# Patient Record
Sex: FEMALE | Race: WHITE | Employment: OTHER | URBAN - METROPOLITAN AREA
[De-identification: names, ages, dates, MRNs, and addresses within clinical notes are randomized per-mention and may not be internally consistent; named-entity substitution may affect disease eponyms.]

---

## 2017-02-16 ENCOUNTER — OFFICE VISIT (OUTPATIENT)
Dept: FAMILY MEDICINE CLINIC | Facility: CLINIC | Age: 77
End: 2017-02-16

## 2017-02-16 VITALS
TEMPERATURE: 98 F | HEIGHT: 60 IN | SYSTOLIC BLOOD PRESSURE: 126 MMHG | DIASTOLIC BLOOD PRESSURE: 78 MMHG | HEART RATE: 67 BPM | WEIGHT: 177 LBS | RESPIRATION RATE: 16 BRPM | BODY MASS INDEX: 34.75 KG/M2

## 2017-02-16 DIAGNOSIS — H90.41 SENSORINEURAL HEARING LOSS (SNHL) OF RIGHT EAR WITH UNRESTRICTED HEARING OF LEFT EAR: ICD-10-CM

## 2017-02-16 DIAGNOSIS — L60.0 INGROWN TOENAIL WITHOUT INFECTION: ICD-10-CM

## 2017-02-16 DIAGNOSIS — I10 ESSENTIAL HYPERTENSION WITH GOAL BLOOD PRESSURE LESS THAN 130/80: Primary | ICD-10-CM

## 2017-02-16 DIAGNOSIS — Z23 NEED FOR PNEUMOCOCCAL VACCINATION: ICD-10-CM

## 2017-02-16 DIAGNOSIS — N39.45 CONTINUOUS LEAKAGE OF URINE: ICD-10-CM

## 2017-02-16 DIAGNOSIS — L82.1 SEBORRHEIC KERATOSES: Chronic | ICD-10-CM

## 2017-02-16 PROCEDURE — 90732 PPSV23 VACC 2 YRS+ SUBQ/IM: CPT | Performed by: FAMILY MEDICINE

## 2017-02-16 PROCEDURE — G0009 ADMIN PNEUMOCOCCAL VACCINE: HCPCS | Performed by: FAMILY MEDICINE

## 2017-02-16 PROCEDURE — 99214 OFFICE O/P EST MOD 30 MIN: CPT | Performed by: FAMILY MEDICINE

## 2017-02-16 NOTE — PROGRESS NOTES
Caryle Eth is a 77year old female. HPI:   Multiple issues. Understands extended time will be needed for list of issues today and agrees:  Here for hypertension management and doing well. Needs Prevnar 23 shot today.  VIS reviewed and wants to get shot of hemorrhage)    • Cerebral aneurysm       Social History:    Smoking Status: Never Smoker                      Alcohol Use: Yes                Comment: rare       REVIEW OF SYSTEMS:   GENERAL HEALTH: multiple issues today--see HPI notes above for further that time was spent in care planning and discussion and remaining time in history and physical exam and she agrees.     Diagnoses and all orders for this visit:    Essential hypertension with goal blood pressure less than 130/80  Managed by cardiology and whitley

## 2017-02-16 NOTE — PATIENT INSTRUCTIONS
Grayson Ramos you were seen for hypertension management and doing well. You also got a Prevnar 23 booster and this is good for seven years. See me for an annual visit.  Take care, Dr. Krystian Wright    Pneumococcal Vaccination  There are 2 pneumococcal vaccinations · In a 4-dose series in infants  · One time in adults; some people need a second dose of one of the vaccines  Your healthcare provider can tell you more about the vaccines, whether you should get them, and the number of shots you should get.   Date Last Rev · Cut back on how much salt you get in your diet. Here’s how to do this:  ¨ Don’t eat foods that have a lot of salt. These include olives, pickles, smoked meats, and salted potato chips. ¨ Don’t add salt to your food at the table.   ¨ Use only small amount · Sudden severe pain in your belly (abdomen)  · Extreme drowsiness, confusion, or fainting  · Dizziness or dizziness with a spinning sensation (vertigo)  · Weakness of an arm or leg or one side of the face  · You have problems speaking or seeing   Date Las

## 2017-03-07 RX ORDER — LISINOPRIL 20 MG/1
TABLET ORAL
Qty: 90 TABLET | Refills: 1 | Status: SHIPPED | OUTPATIENT
Start: 2017-03-07 | End: 2017-09-12

## 2017-06-12 DIAGNOSIS — E78.5 DYSLIPIDEMIA: Primary | ICD-10-CM

## 2017-06-12 RX ORDER — SIMVASTATIN 40 MG
TABLET ORAL
Qty: 30 TABLET | Refills: 0 | Status: SHIPPED | OUTPATIENT
Start: 2017-06-12 | End: 2017-07-17

## 2017-06-12 NOTE — TELEPHONE ENCOUNTER
Per protocol patient is overdue for lipid labs, lab placed in system for pt to complete.  Medication sent for 30 days no refills until pt completes labs,

## 2017-06-16 ENCOUNTER — LAB ENCOUNTER (OUTPATIENT)
Dept: LAB | Age: 77
End: 2017-06-16
Attending: FAMILY MEDICINE
Payer: MEDICARE

## 2017-06-16 DIAGNOSIS — E78.5 HYPERLIPEMIA: Primary | ICD-10-CM

## 2017-06-16 PROCEDURE — 36415 COLL VENOUS BLD VENIPUNCTURE: CPT

## 2017-06-16 PROCEDURE — 80061 LIPID PANEL: CPT

## 2017-06-23 NOTE — PROGRESS NOTES
Quick Note:    Please call pt with normal results of lipid panel and healthy increase in HDL to 99 now and repeat as directed at least annually --- Dr. Symone Owens  ______

## 2017-07-17 DIAGNOSIS — E78.5 DYSLIPIDEMIA: ICD-10-CM

## 2017-07-17 RX ORDER — SIMVASTATIN 40 MG
TABLET ORAL
Qty: 90 TABLET | Refills: 1 | Status: SHIPPED | OUTPATIENT
Start: 2017-07-17 | End: 2017-07-20

## 2017-07-18 ENCOUNTER — TELEPHONE (OUTPATIENT)
Dept: FAMILY MEDICINE CLINIC | Facility: CLINIC | Age: 77
End: 2017-07-18

## 2017-07-18 DIAGNOSIS — E78.5 DYSLIPIDEMIA: ICD-10-CM

## 2017-07-18 DIAGNOSIS — Z12.39 BREAST CANCER SCREENING: Primary | ICD-10-CM

## 2017-07-18 NOTE — TELEPHONE ENCOUNTER
Pt needs an order placed for mammo placed in system. Let front staff know when completed so we can call pt.     Prior Vinny Hagan is need for Simivastatin 40, tried to contact lyle 4 times unable to leave a message

## 2017-07-20 RX ORDER — SIMVASTATIN 40 MG
TABLET ORAL
Qty: 90 TABLET | Refills: 1 | Status: SHIPPED | OUTPATIENT
Start: 2017-07-20 | End: 2018-03-12

## 2017-07-20 NOTE — TELEPHONE ENCOUNTER
Call pt that her mammogram is ordered. Please send message to Anya Harley to get pt prior authorization for the Simvastatin.    Dr. Lagos Areas

## 2017-08-10 ENCOUNTER — HOSPITAL ENCOUNTER (OUTPATIENT)
Dept: MAMMOGRAPHY | Age: 77
Discharge: HOME | End: 2017-08-10
Attending: FAMILY MEDICINE
Payer: MEDICARE

## 2017-08-10 DIAGNOSIS — Z12.39 BREAST CANCER SCREENING: ICD-10-CM

## 2017-08-10 PROCEDURE — 77067 SCR MAMMO BI INCL CAD: CPT | Performed by: FAMILY MEDICINE

## 2017-08-29 ENCOUNTER — OFFICE VISIT (OUTPATIENT)
Dept: FAMILY MEDICINE CLINIC | Facility: CLINIC | Age: 77
End: 2017-08-29

## 2017-08-29 VITALS
HEART RATE: 76 BPM | DIASTOLIC BLOOD PRESSURE: 76 MMHG | HEIGHT: 60 IN | SYSTOLIC BLOOD PRESSURE: 122 MMHG | WEIGHT: 179 LBS | RESPIRATION RATE: 16 BRPM | BODY MASS INDEX: 35.14 KG/M2 | OXYGEN SATURATION: 99 % | TEMPERATURE: 98 F

## 2017-08-29 DIAGNOSIS — Z71.3 DIETARY COUNSELING: ICD-10-CM

## 2017-08-29 DIAGNOSIS — E55.9 VITAMIN D DEFICIENCY: ICD-10-CM

## 2017-08-29 DIAGNOSIS — Z00.00 ENCOUNTER FOR ANNUAL HEALTH EXAMINATION: Primary | ICD-10-CM

## 2017-08-29 DIAGNOSIS — Z71.82 EXERCISE COUNSELING: ICD-10-CM

## 2017-08-29 DIAGNOSIS — Z13.31 DEPRESSION SCREENING: ICD-10-CM

## 2017-08-29 LAB
APPEARANCE: CLEAR
MULTISTIX LOT#: NORMAL NUMERIC
PH, URINE: 7 (ref 4.5–8)
SPECIFIC GRAVITY: 1.02 (ref 1–1.03)
URINE-COLOR: YELLOW
UROBILINOGEN,SEMI-QN: 0.2 MG/DL (ref 0–1.9)

## 2017-08-29 PROCEDURE — G0439 PPPS, SUBSEQ VISIT: HCPCS | Performed by: FAMILY MEDICINE

## 2017-08-29 PROCEDURE — G0444 DEPRESSION SCREEN ANNUAL: HCPCS | Performed by: FAMILY MEDICINE

## 2017-08-29 PROCEDURE — 81003 URINALYSIS AUTO W/O SCOPE: CPT | Performed by: FAMILY MEDICINE

## 2017-08-29 NOTE — PATIENT INSTRUCTIONS
Veronica Baker's SCREENING SCHEDULE   Tests on this list are recommended by your physician but may not be covered, or covered at this frequency, by your insurer. Please check with your insurance carrier before scheduling to verify coverage.    PREVENTATIV doctor annually    Bone Density Screening     Bone density screening   Covered every 2 yrs after age 72    Covered yearly for Long term Glucocorticoid medication (Steroids) Requires diagnosis related to osteoporosis or estrogen deficiency.    Biennial benef home computer and printer. (the forms are also available in [address])  [email or web address]  This link also has information from the [facility] regarding Advance Directives.

## 2017-08-29 NOTE — PROGRESS NOTES
HPI:   Phani Gillette is a 69year old female who presents for a Medicare Subsequent Annual Wellness visit (Pt already had Initial Annual Wellness).   Pt sees neurosurgery after aneurysm treatment and no headaches; has age related cataract and glaucoma the 8/29/17 encounter (Office Visit) with Sheba Apple, DO:  simvastatin 40 MG Oral Tab TAKE 1 TABLET BY MOUTH EVERY EVENING   LISINOPRIL 20 MG Oral Tab TAKE 1 TABLET(20 MG) BY MOUTH EVERY DAY   diazepam 2 MG Oral Tab 1-2 tab po one hour before MRI   C pain, has chronic heartburn  : denies dysuria, vaginal discharge or itching, no complaint of urinary incontinence   MUSCULOSKELETAL: denies back pain  NEURO: denies headaches; aneurysm repair history   PSYCHE: denies depression but recent anxiety  HEMATO supraclavicular, and axillary nodes normal   Neurologic: Normal         SUGGESTED VACCINATIONS - Influenza, Pneumococcal, Zoster, Tetanus     Immunization History   Administered Date(s) Administered   • Fluzone Vaccine Medicare () 10/22/2014   • HIGH patient and provided information       Healthcare Power edwin Dumont on file in Epic:    Ho Mackey has a Power of  for [employer] on file in [address] [place] [address] Rd.                  General Health     In the past six months, have you lost more than 10 pounds wit LAST 2 WEEKS   Little interest or pleasure in doing things (over the last two weeks)?: Not at all    Feeling down, depressed, or hopeless (over the last two weeks)?: Not at all    PHQ-2 SCORE: 0        Advance Directives     Do you have a healthcare power Immunization Activity if applicable)     Influenza  Covered Annually 10/14/2016 Please get every year    Pneumococcal 13 (Prevnar)  Covered Once after 65 No vaccine history found Please get once after your 65th birthday    Pneumococcal 23 (Pneumovax)  [place]

## 2017-09-12 RX ORDER — LISINOPRIL 20 MG/1
TABLET ORAL
Qty: 90 TABLET | Refills: 0 | Status: SHIPPED | OUTPATIENT
Start: 2017-09-12 | End: 2017-12-14

## 2017-09-12 NOTE — TELEPHONE ENCOUNTER
Requesting: Lisinopril 20mg   LOV: 8/29/17  RTC: 6 months  Last Labs: 8/18/16 - CMP  Filled: 3/7/17 #90 with 1 refills    Future Appointments  Date Time Provider Ema Michael   10/26/2017 4:00 PM Wyline Ormond, MD [facility]     Refill michael

## 2017-11-15 ENCOUNTER — IMMUNIZATION (OUTPATIENT)
Dept: FAMILY MEDICINE CLINIC | Facility: CLINIC | Age: 77
End: 2017-11-15

## 2017-11-15 ENCOUNTER — MED REC SCAN ONLY (OUTPATIENT)
Dept: FAMILY MEDICINE CLINIC | Facility: CLINIC | Age: 77
End: 2017-11-15

## 2017-11-15 PROCEDURE — 90653 IIV ADJUVANT VACCINE IM: CPT | Performed by: FAMILY MEDICINE

## 2017-11-15 PROCEDURE — G0008 ADMIN INFLUENZA VIRUS VAC: HCPCS | Performed by: FAMILY MEDICINE

## 2017-11-22 ENCOUNTER — LAB ENCOUNTER (OUTPATIENT)
Dept: LAB | Age: 77
End: 2017-11-22
Attending: FAMILY MEDICINE
Payer: MEDICARE

## 2017-11-22 DIAGNOSIS — Z00.00 ENCOUNTER FOR ANNUAL HEALTH EXAMINATION: ICD-10-CM

## 2017-11-22 PROCEDURE — 84443 ASSAY THYROID STIM HORMONE: CPT

## 2017-11-22 PROCEDURE — 80053 COMPREHEN METABOLIC PANEL: CPT | Performed by: FAMILY MEDICINE

## 2017-11-22 PROCEDURE — 84439 ASSAY OF FREE THYROXINE: CPT

## 2017-11-22 PROCEDURE — 85025 COMPLETE CBC W/AUTO DIFF WBC: CPT | Performed by: FAMILY MEDICINE

## 2017-11-22 PROCEDURE — 36415 COLL VENOUS BLD VENIPUNCTURE: CPT | Performed by: FAMILY MEDICINE

## 2017-11-22 PROCEDURE — 82306 VITAMIN D 25 HYDROXY: CPT | Performed by: FAMILY MEDICINE

## 2017-11-23 NOTE — PROGRESS NOTES
Please call pt with normal results of thyroid labs  and repeat annually as directed---  Dr. Mooney Fraction

## 2017-11-23 NOTE — PROGRESS NOTES
Please call pt with stable lab results of CBC, VIT D and CMP and repeat as directed in 6 mos again--  Dr. Joey Andersen

## 2017-12-14 RX ORDER — LISINOPRIL 20 MG/1
TABLET ORAL
Qty: 90 TABLET | Refills: 0 | Status: SHIPPED | OUTPATIENT
Start: 2017-12-14 | End: 2018-03-12

## 2017-12-14 NOTE — TELEPHONE ENCOUNTER
Requested Medications   LISINOPRIL 20MG TABLETS  Will file in chart as: LISINOPRIL 20 MG Oral Tab  TAKE 1 TABLET(20 MG) BY MOUTH EVERY DAY       Disp: 90 tablet Refills: 0    Class: Normal Start: 12/14/2017   Originally ordered: 2 years ago by Ravinder Contreras

## 2017-12-22 PROCEDURE — 88305 TISSUE EXAM BY PATHOLOGIST: CPT | Performed by: INTERNAL MEDICINE

## 2018-03-08 RX ORDER — LISINOPRIL 20 MG/1
TABLET ORAL
Qty: 90 TABLET | Refills: 0 | OUTPATIENT
Start: 2018-03-08

## 2018-03-08 NOTE — TELEPHONE ENCOUNTER
No future appointments. Medication denied. Pt Is overdue for office visit. Pharmacy flagged to have pt call and schedule an appt.

## 2018-03-12 ENCOUNTER — OFFICE VISIT (OUTPATIENT)
Dept: FAMILY MEDICINE CLINIC | Facility: CLINIC | Age: 78
End: 2018-03-12

## 2018-03-12 VITALS
TEMPERATURE: 97 F | SYSTOLIC BLOOD PRESSURE: 122 MMHG | HEIGHT: 60 IN | HEART RATE: 76 BPM | BODY MASS INDEX: 35.5 KG/M2 | WEIGHT: 180.81 LBS | DIASTOLIC BLOOD PRESSURE: 80 MMHG | RESPIRATION RATE: 16 BRPM

## 2018-03-12 DIAGNOSIS — E78.5 DYSLIPIDEMIA: ICD-10-CM

## 2018-03-12 DIAGNOSIS — Z23 NEED FOR VACCINATION FOR STREP PNEUMONIAE: ICD-10-CM

## 2018-03-12 DIAGNOSIS — I10 ESSENTIAL HYPERTENSION WITH GOAL BLOOD PRESSURE LESS THAN 130/80: Primary | ICD-10-CM

## 2018-03-12 PROCEDURE — 99213 OFFICE O/P EST LOW 20 MIN: CPT | Performed by: FAMILY MEDICINE

## 2018-03-12 PROCEDURE — G0009 ADMIN PNEUMOCOCCAL VACCINE: HCPCS | Performed by: FAMILY MEDICINE

## 2018-03-12 PROCEDURE — 90670 PCV13 VACCINE IM: CPT | Performed by: FAMILY MEDICINE

## 2018-03-12 RX ORDER — SIMVASTATIN 40 MG
TABLET ORAL
Qty: 90 TABLET | Refills: 3 | Status: SHIPPED | OUTPATIENT
Start: 2018-03-12 | End: 2019-04-23

## 2018-03-12 RX ORDER — LISINOPRIL 20 MG/1
TABLET ORAL
Qty: 90 TABLET | Refills: 3 | Status: SHIPPED | OUTPATIENT
Start: 2018-03-12 | End: 2019-03-07

## 2018-03-12 NOTE — PROGRESS NOTES
[facility] Group Visit Note  3/12/2018      Subjective:      Patient ID: Kaylyn Singh is a 69year old female. Chief Complaint:  Patient presents with:  Establish Care: former Dr. Ann Montanez patient. Med refills.       HPI:  Kaylyn Singh is a 69 yea

## 2018-07-24 ENCOUNTER — TELEPHONE (OUTPATIENT)
Dept: FAMILY MEDICINE CLINIC | Facility: CLINIC | Age: 78
End: 2018-07-24

## 2018-07-24 DIAGNOSIS — Z12.31 ENCOUNTER FOR SCREENING MAMMOGRAM FOR MALIGNANT NEOPLASM OF BREAST: Primary | ICD-10-CM

## 2018-08-16 ENCOUNTER — HOSPITAL ENCOUNTER (OUTPATIENT)
Dept: MAMMOGRAPHY | Age: 78
Discharge: HOME | End: 2018-08-16
Attending: FAMILY MEDICINE
Payer: MEDICARE

## 2018-08-16 DIAGNOSIS — Z12.31 ENCOUNTER FOR SCREENING MAMMOGRAM FOR MALIGNANT NEOPLASM OF BREAST: ICD-10-CM

## 2018-08-16 PROCEDURE — 77067 SCR MAMMO BI INCL CAD: CPT | Performed by: FAMILY MEDICINE

## 2018-08-16 PROCEDURE — 77063 BREAST TOMOSYNTHESIS BI: CPT | Performed by: FAMILY MEDICINE

## 2018-09-13 ENCOUNTER — OFFICE VISIT (OUTPATIENT)
Dept: FAMILY MEDICINE CLINIC | Facility: CLINIC | Age: 78
End: 2018-09-13
Payer: MEDICARE

## 2018-09-13 VITALS
WEIGHT: 181 LBS | SYSTOLIC BLOOD PRESSURE: 124 MMHG | DIASTOLIC BLOOD PRESSURE: 80 MMHG | TEMPERATURE: 98 F | RESPIRATION RATE: 16 BRPM | HEIGHT: 60 IN | BODY MASS INDEX: 35.53 KG/M2 | HEART RATE: 80 BPM

## 2018-09-13 DIAGNOSIS — E55.9 VITAMIN D DEFICIENCY: ICD-10-CM

## 2018-09-13 DIAGNOSIS — M47.12 OSTEOARTHRITIS OF CERVICAL SPINE WITH MYELOPATHY: ICD-10-CM

## 2018-09-13 DIAGNOSIS — E78.5 DYSLIPIDEMIA: ICD-10-CM

## 2018-09-13 DIAGNOSIS — Z78.0 POSTMENOPAUSAL: ICD-10-CM

## 2018-09-13 DIAGNOSIS — Z00.00 ENCOUNTER FOR ANNUAL HEALTH EXAMINATION: Primary | ICD-10-CM

## 2018-09-13 DIAGNOSIS — I10 ESSENTIAL HYPERTENSION: ICD-10-CM

## 2018-09-13 DIAGNOSIS — M47.814 OSTEOARTHRITIS OF THORACIC SPINE, UNSPECIFIED SPINAL OSTEOARTHRITIS COMPLICATION STATUS: ICD-10-CM

## 2018-09-13 DIAGNOSIS — M85.80 OSTEOPENIA, UNSPECIFIED LOCATION: ICD-10-CM

## 2018-09-13 DIAGNOSIS — M85.89 OSTEOPENIA OF MULTIPLE SITES: ICD-10-CM

## 2018-09-13 DIAGNOSIS — H91.91 HEARING LOSS OF RIGHT EAR, UNSPECIFIED HEARING LOSS TYPE: ICD-10-CM

## 2018-09-13 PROCEDURE — G0439 PPPS, SUBSEQ VISIT: HCPCS | Performed by: FAMILY MEDICINE

## 2018-09-13 NOTE — PROGRESS NOTES
HPI:   Ignacia Guo is a 69year old female who presents for a MA (Medicare Subsequent Visit. Her last annual assessment has been over 1 year: Annual Physical due on 08/29/2018         Imms- wants to wait until October for the flu shot.    Received Z conditions?: 1-Yes  Have you fallen in the last 12 months?: 0-No  Do you accidently lose urine?: 1-Yes  Do you have difficulty seeing?: 1-Yes  Do you have any difficulty walking or getting up?: 1-Yes(Getting Up)  Do you have any tripping hazards?: 0-No  Ar Osteopenia     Essential hypertension    Wt Readings from Last 3 Encounters:  09/13/18 : 181 lb  03/12/18 : 180 lb 12.8 oz  12/13/17 : 182 lb     Last Cholesterol Labs:   Lab Results   Component Value Date    CHOLEST 179 06/16/2017    HDL 91 06/16/2017 family history includes Breast Cancer (age of onset: 80) in her maternal aunt; Heart Disease in her brother and mother; Other in her mother; Stroke in her mother; ataxia in her brother; kidney stones in her mother.    SOCIAL HISTORY:   She  reports that  ha discuss further if sxs worsen    Osteoarthritis of thoracic spine, unspecified spinal osteoarthritis complication status  -aware, advised tylenol/ibuprofen and reposition in bed.  Will discuss further if sxs worsen    Hearing loss of right ear, unspecified Screen every 10 years There are no preventive care reminders to display for this patient. Update Health Maintenance if applicable    Flex Sigmoidoscopy Screen every 10 years No results found for this or any previous visit. No flowsheet data found.      Peter benefits, but Medicare does not cover unless Medically needed    Zoster  Not covered by Medicare Part B No vaccine history found This may be covered with your pharmacy  prescription benefits      SPECIFIC DISEASE MONITORING Internal Lab or Procedure Extern

## 2018-09-21 ENCOUNTER — HOSPITAL ENCOUNTER (OUTPATIENT)
Dept: BONE DENSITY | Age: 78
Discharge: HOME | End: 2018-09-21
Attending: FAMILY MEDICINE
Payer: MEDICARE

## 2018-09-21 DIAGNOSIS — Z78.0 POSTMENOPAUSAL: ICD-10-CM

## 2018-09-21 DIAGNOSIS — M85.80 OSTEOPENIA, UNSPECIFIED LOCATION: ICD-10-CM

## 2018-09-21 PROCEDURE — 77080 DXA BONE DENSITY AXIAL: CPT | Performed by: FAMILY MEDICINE

## 2018-10-05 ENCOUNTER — OFFICE VISIT (OUTPATIENT)
Dept: FAMILY MEDICINE CLINIC | Facility: CLINIC | Age: 78
End: 2018-10-05
Payer: MEDICARE

## 2018-10-05 VITALS
RESPIRATION RATE: 16 BRPM | TEMPERATURE: 98 F | WEIGHT: 185 LBS | BODY MASS INDEX: 36.32 KG/M2 | SYSTOLIC BLOOD PRESSURE: 126 MMHG | DIASTOLIC BLOOD PRESSURE: 80 MMHG | HEART RATE: 71 BPM | HEIGHT: 60 IN

## 2018-10-05 DIAGNOSIS — Z28.21 INFLUENZA VACCINATION DECLINED BY PATIENT: ICD-10-CM

## 2018-10-05 DIAGNOSIS — M85.859 OSTEOPENIA OF NECK OF FEMUR, UNSPECIFIED LATERALITY: Primary | ICD-10-CM

## 2018-10-05 PROCEDURE — 99213 OFFICE O/P EST LOW 20 MIN: CPT | Performed by: FAMILY MEDICINE

## 2018-10-05 NOTE — PATIENT INSTRUCTIONS
Living with Osteoporosis: Preventing Fractures  If you have osteoporosis, you can do a lot to reduce its effect on your life. Knowing how to prevent fractures and spinal curvature can help you live more comfortably and safely with this disease.     [facility] If you have osteoporosis, exercise is vital for your health. It can prevent bone fractures and spine changes. It will slow bone loss. Exercise will strengthen your body. It can also be fun.  A variety of exercises is best. See below for exercises that can h Several medicines make up the class of drugs known as bisphosphonates. Bisphosphonates are the most common type of medicine used to help prevent and treat bone loss. Bisphosphonates are given in pill or injectable form as an IV infusion.  They must be taken · Inactivity makes your bones lose strength and become thinner. Over time, thin bones may break. Women who aren't active are at a high risk for osteoporosis. · Certain medicines, such as cortisone, increase bone loss. They also decrease bone growth.  Ask y

## 2018-11-09 RX ORDER — ALENDRONATE SODIUM 70 MG/1
70 TABLET ORAL WEEKLY
Qty: 12 TABLET | Refills: 3 | Status: SHIPPED | OUTPATIENT
Start: 2018-11-09 | End: 2019-10-03

## 2018-11-09 NOTE — TELEPHONE ENCOUNTER
RX pended for approval   LOV: 10/5/18  . I advised her she could be back to alendronate 1/wk or do Prolia injections.  She will think about it and let me know when she comes back from vacation in [place] at the end of October.  -advised cont her ca + vit

## 2018-11-09 NOTE — TELEPHONE ENCOUNTER
patient called - said Dr Flor Helms advised her to call us once she decided if she wanted to start taking Alendronate 70 mg again and she is ready to begin the medication. She would like a 90 day supply - [facility].  She is coming here next [facility] [employer]

## 2018-11-15 ENCOUNTER — IMMUNIZATION (OUTPATIENT)
Dept: FAMILY MEDICINE CLINIC | Facility: CLINIC | Age: 78
End: 2018-11-15
Payer: MEDICARE

## 2018-11-15 ENCOUNTER — APPOINTMENT (OUTPATIENT)
Dept: LAB | Age: 78
End: 2018-11-15
Attending: FAMILY MEDICINE
Payer: MEDICARE

## 2018-11-15 DIAGNOSIS — E55.9 VITAMIN D DEFICIENCY: ICD-10-CM

## 2018-11-15 DIAGNOSIS — I10 ESSENTIAL HYPERTENSION: ICD-10-CM

## 2018-11-15 DIAGNOSIS — E78.5 DYSLIPIDEMIA: ICD-10-CM

## 2018-11-15 PROCEDURE — 80061 LIPID PANEL: CPT

## 2018-11-15 PROCEDURE — 90653 IIV ADJUVANT VACCINE IM: CPT | Performed by: FAMILY MEDICINE

## 2018-11-15 PROCEDURE — 80053 COMPREHEN METABOLIC PANEL: CPT

## 2018-11-15 PROCEDURE — 82306 VITAMIN D 25 HYDROXY: CPT

## 2018-11-15 PROCEDURE — 36415 COLL VENOUS BLD VENIPUNCTURE: CPT

## 2018-11-15 PROCEDURE — G0008 ADMIN INFLUENZA VIRUS VAC: HCPCS | Performed by: FAMILY MEDICINE

## 2019-01-15 ENCOUNTER — OFFICE VISIT (OUTPATIENT)
Dept: PODIATRY CLINIC | Facility: CLINIC | Age: 79
End: 2019-01-15
Payer: MEDICARE

## 2019-01-15 VITALS — HEART RATE: 72 BPM | RESPIRATION RATE: 16 BRPM | SYSTOLIC BLOOD PRESSURE: 142 MMHG | DIASTOLIC BLOOD PRESSURE: 68 MMHG

## 2019-01-15 DIAGNOSIS — L84 HELOMA DURUM: ICD-10-CM

## 2019-01-15 DIAGNOSIS — M20.41 HAMMER TOES OF BOTH FEET: ICD-10-CM

## 2019-01-15 DIAGNOSIS — M72.2 PLANTAR FASCIITIS OF RIGHT FOOT: Primary | ICD-10-CM

## 2019-01-15 DIAGNOSIS — M20.42 HAMMER TOES OF BOTH FEET: ICD-10-CM

## 2019-01-15 PROCEDURE — 99213 OFFICE O/P EST LOW 20 MIN: CPT | Performed by: PODIATRIST

## 2019-01-15 PROCEDURE — 20550 NJX 1 TENDON SHEATH/LIGAMENT: CPT | Performed by: PODIATRIST

## 2019-01-15 RX ORDER — TRIAMCINOLONE ACETONIDE 40 MG/ML
40 INJECTION, SUSPENSION INTRA-ARTICULAR; INTRAMUSCULAR ONCE
Status: COMPLETED | OUTPATIENT
Start: 2019-01-15 | End: 2019-01-15

## 2019-01-15 NOTE — PROGRESS NOTES
Preparred as ordered by Dr. Nadeem Pelayo. 1 cc of kenalog 40 and 1 cc of .5% marcaine. Pt given steroid information sheet. Pre injection vs stable. Consent signed by Grayson Ramos, myself and Dr. Nadeem Pelayo for Right foot steroid injection.  Medication administration per D

## 2019-01-19 NOTE — PROGRESS NOTES
Sean Craig is a 69year old female. Patient presents with:  Consult: Pt has been seen at Dr. Mckayla Harrison previous office. Pt radha nails/ corns? on bilateral 5th toes and also something to the bottom of her right foot possible callus for trimming.   Corn  Ca OIL) 875 MG Oral Cap Take 1,500 mg by mouth.    Disp:  Rfl:       Past Medical History:   Diagnosis Date   • Carotid artery stenosis    • Cerebral aneurysm 2014    s/p repair, Complex 6.5 x 3.5 x 3.5 mm right MCA bifurcation aneurysm   • Cervical disc disea Yes          REVIEW OF SYSTEMS:   Review of Systems  GENERAL HEALTH: feels well otherwise  SKIN: denies any unusual skin lesions or rashes  RESPIRATORY: denies shortness of breath with exertion  CARDIOVASCULAR: denies chest pain on exertion  GI: denies abd of these issues and agrees to the plan. No Follow-up on file.     True Lesch, DPM  1/19/2019

## 2019-03-07 DIAGNOSIS — I10 ESSENTIAL HYPERTENSION WITH GOAL BLOOD PRESSURE LESS THAN 130/80: ICD-10-CM

## 2019-03-07 RX ORDER — LISINOPRIL 20 MG/1
TABLET ORAL
Qty: 90 TABLET | Refills: 0 | Status: SHIPPED | OUTPATIENT
Start: 2019-03-07 | End: 2019-06-04

## 2019-03-07 NOTE — TELEPHONE ENCOUNTER
Passed Protocol. Last OV 10-5-18 Dr. Henri Romero 9-13-18 with request for 6 month follow up. Pt has a scheduled 3/14/19 appointment with Dr. Zoya Iqbal. CMP 11/15/18, normal.  Sent RX. Task completed.

## 2019-03-14 ENCOUNTER — OFFICE VISIT (OUTPATIENT)
Dept: FAMILY MEDICINE CLINIC | Facility: CLINIC | Age: 79
End: 2019-03-14
Payer: MEDICARE

## 2019-03-14 VITALS
DIASTOLIC BLOOD PRESSURE: 82 MMHG | WEIGHT: 184 LBS | SYSTOLIC BLOOD PRESSURE: 126 MMHG | HEIGHT: 60 IN | BODY MASS INDEX: 36.12 KG/M2 | HEART RATE: 80 BPM | RESPIRATION RATE: 16 BRPM | TEMPERATURE: 98 F

## 2019-03-14 DIAGNOSIS — M54.6 ACUTE MIDLINE THORACIC BACK PAIN: Primary | ICD-10-CM

## 2019-03-14 DIAGNOSIS — M85.859 OSTEOPENIA OF NECK OF FEMUR, UNSPECIFIED LATERALITY: ICD-10-CM

## 2019-03-14 DIAGNOSIS — I65.29 STENOSIS OF CAROTID ARTERY, UNSPECIFIED LATERALITY: ICD-10-CM

## 2019-03-14 DIAGNOSIS — I10 ESSENTIAL HYPERTENSION: ICD-10-CM

## 2019-03-14 PROBLEM — M72.2 PLANTAR FASCIITIS OF RIGHT FOOT: Status: ACTIVE | Noted: 2018-10-01

## 2019-03-14 PROCEDURE — 99214 OFFICE O/P EST MOD 30 MIN: CPT | Performed by: FAMILY MEDICINE

## 2019-03-14 NOTE — PATIENT INSTRUCTIONS
Back Basics: A Healthy Spine  A healthy spine supports the body while letting it move freely. It does this with the help of three natural curves. Strong, flexible muscles help, too. They support the spine by keeping its curves properly aligned.  The disks Improving your flexibility can reduce pain. Stretching exercises also can help increase your range of pain-free motion. Breathe normally when you exercise. Try to use smooth, fluid movements. Never force a stretch.   Note: Follow any special instructions yo © 0482-1713 [employer] [address]. [address] [facility], [address] [place] [address]. All rights reserved. This information is not intended as a substitute for professional medical care. Always follow your healthcare professional's instructions.         Back Ex · Relax your abdominal and buttocks muscles, lift your head, and let your back sag. Be sure to keep your weight evenly distributed. Don’t sit back on your hips.   · Hold for 5 seconds. · Return to starting position.   · Tuck your head and lift (arch) your

## 2019-03-14 NOTE — PROGRESS NOTES
[facility] Group Visit Note  3/14/2019      Subjective:      Patient ID: Deena Colindres is a 69year old female. Chief Complaint:  Patient presents with:   Follow - Up: here for routine 6 month f/u  Back Pain: c/o pain off & on across her upper back 80   Resp: 16   Temp: 97.8 °F (36.6 °C)   TempSrc: Temporal   Weight: 184 lb   Height: 60\"       Physical Examination   General:  Alert, in no acute distress  HEENT: NCAT, EOMI, mucus membranes moist   Neck:  No cervical lymphadenopathy  CV: Regular rate

## 2019-03-18 ENCOUNTER — PATIENT OUTREACH (OUTPATIENT)
Dept: CASE MANAGEMENT | Age: 79
End: 2019-03-18

## 2019-03-18 NOTE — PROGRESS NOTES
Called pt for ccm intro.    Pt states at this time she does not want to enroll in CCM program  Future Appointments   Date Time Provider Ema Fernanda   3/21/2019 10:30 AM PF [facility]   9/19/2019 10:00 AM Mary Carroll MD [facility]

## 2019-03-21 ENCOUNTER — HOSPITAL ENCOUNTER (OUTPATIENT)
Dept: ULTRASOUND IMAGING | Age: 79
Discharge: HOME | End: 2019-03-21
Attending: FAMILY MEDICINE
Payer: MEDICARE

## 2019-03-21 DIAGNOSIS — I65.29 STENOSIS OF CAROTID ARTERY, UNSPECIFIED LATERALITY: ICD-10-CM

## 2019-03-21 PROCEDURE — 93880 EXTRACRANIAL BILAT STUDY: CPT | Performed by: FAMILY MEDICINE

## 2019-04-23 DIAGNOSIS — E78.5 DYSLIPIDEMIA: ICD-10-CM

## 2019-04-24 RX ORDER — SIMVASTATIN 40 MG
TABLET ORAL
Qty: 90 TABLET | Refills: 1 | Status: SHIPPED | OUTPATIENT
Start: 2019-04-24 | End: 2019-10-03

## 2019-04-24 NOTE — TELEPHONE ENCOUNTER
Requesting: Simvastatin 40 mg Tab  LOV: 3/14/19  RTC: 6 months  Last Relevant Labs: 11/15/18  Filled: 3/12/18 #90 with 3 refills    Future Appointments   Date Time Provider Ema Michael   10/3/2019 10:00 AM Nancie Cade MD [facility] [address] [facility] [address]

## 2019-04-29 ENCOUNTER — OFFICE VISIT (OUTPATIENT)
Dept: FAMILY MEDICINE CLINIC | Facility: CLINIC | Age: 79
End: 2019-04-29
Payer: MEDICARE

## 2019-04-29 VITALS
SYSTOLIC BLOOD PRESSURE: 124 MMHG | HEIGHT: 60 IN | WEIGHT: 186 LBS | DIASTOLIC BLOOD PRESSURE: 74 MMHG | BODY MASS INDEX: 36.52 KG/M2 | HEART RATE: 82 BPM | RESPIRATION RATE: 16 BRPM | TEMPERATURE: 98 F

## 2019-04-29 DIAGNOSIS — M51.34 DDD (DEGENERATIVE DISC DISEASE), THORACIC: ICD-10-CM

## 2019-04-29 DIAGNOSIS — M54.6 ACUTE RIGHT-SIDED THORACIC BACK PAIN: Primary | ICD-10-CM

## 2019-04-29 PROCEDURE — 99213 OFFICE O/P EST LOW 20 MIN: CPT | Performed by: FAMILY MEDICINE

## 2019-04-29 RX ORDER — BIOTIN 1 MG
1 TABLET ORAL 3 TIMES DAILY
COMMUNITY

## 2019-04-29 NOTE — PROGRESS NOTES
[facility] Group Visit Note  4/29/2019      Subjective:      Patient ID: Isidoro Higginbotham is a 67year old female.     Chief Complaint:  Patient presents with:  Back Pain: c/o  having  back pain R>L, worse when she first gets up in the mornings, but gets contributes to mild to moderate spinal canal stenosis with mild flattening of the ventral spinal cord.  There is moderate to severe bilateral neural foraminal stenosis at C5-6.     4. There is mild spinal canal stenosis with minimal flattening of the ventra

## 2019-04-29 NOTE — PATIENT INSTRUCTIONS
Back Exercises: Abdominal Lift Brace with Marching    The abdominal lift brace with march strengthens your lower abdominal muscles, helping you keep your pelvis and back stable:  · Lie on the floor with both knees bent.  Put your feet flat on the floor an · Tighten your stomach and buttock muscles to press your back upward. Let your head drop slightly. · Hold for 5 seconds. Return to starting position. · Repeat 5 times. Date Last Reviewed: 3/1/2018  © 3129-5257 [employer] [address].  [facility] To start, lie on your back with your knees bent and feet flat on the floor. Don’t press your neck or lower back to the floor. Breathe deeply. You should feel comfortable and relaxed in this position. · Rest your right ankle on your left knee.   · Place a t · Repeat 2 times. · Switch legs. · For a double leg pull, pull both legs to your chest at the same time. Repeat 2 times.   For your safety, check with your healthcare provider before starting an exercise program.   Date Last Reviewed: 3/1/2018  © 2000-201 Lower Body Exercises: Quad Stretch    This exercise stretches  your lower body to help your back. As you work out, don’t rush or strain. Stand arm’s length from a wall. Place one hand on it.   Here are the other steps to the quad stretch:  · With your other

## 2019-06-04 DIAGNOSIS — I10 ESSENTIAL HYPERTENSION WITH GOAL BLOOD PRESSURE LESS THAN 130/80: ICD-10-CM

## 2019-06-04 RX ORDER — LISINOPRIL 20 MG/1
TABLET ORAL
Qty: 90 TABLET | Refills: 0 | Status: SHIPPED | OUTPATIENT
Start: 2019-06-04 | End: 2019-09-03

## 2019-06-04 NOTE — TELEPHONE ENCOUNTER
Hypertension Medications Protocol Passed  Requesting Lisinopril 20mg  LOV: 4/29/19  RTC: prn  Last Labs: 11/15/18  Filled: 3/7/19 #90 with 0 refills    Future Appointments   Date Time Provider Ema Michael   10/3/2019 10:00 AM Jd Peterson MD

## 2019-07-18 ENCOUNTER — TELEPHONE (OUTPATIENT)
Dept: FAMILY MEDICINE CLINIC | Facility: CLINIC | Age: 79
End: 2019-07-18

## 2019-07-18 DIAGNOSIS — Z12.31 ENCOUNTER FOR SCREENING MAMMOGRAM FOR MALIGNANT NEOPLASM OF BREAST: Primary | ICD-10-CM

## 2019-07-18 NOTE — TELEPHONE ENCOUNTER
Pt would like an order for a mammogram to have it done in August. Please call patient once order is placed.

## 2019-08-29 ENCOUNTER — HOSPITAL ENCOUNTER (OUTPATIENT)
Dept: MAMMOGRAPHY | Age: 79
Discharge: HOME | End: 2019-08-29
Attending: PHYSICIAN ASSISTANT
Payer: MEDICARE

## 2019-08-29 DIAGNOSIS — Z12.31 ENCOUNTER FOR SCREENING MAMMOGRAM FOR MALIGNANT NEOPLASM OF BREAST: ICD-10-CM

## 2019-08-29 PROCEDURE — 77067 SCR MAMMO BI INCL CAD: CPT | Performed by: PHYSICIAN ASSISTANT

## 2019-08-29 PROCEDURE — 77063 BREAST TOMOSYNTHESIS BI: CPT | Performed by: PHYSICIAN ASSISTANT

## 2019-09-03 DIAGNOSIS — I10 ESSENTIAL HYPERTENSION WITH GOAL BLOOD PRESSURE LESS THAN 130/80: ICD-10-CM

## 2019-09-04 RX ORDER — LISINOPRIL 20 MG/1
TABLET ORAL
Qty: 90 TABLET | Refills: 1 | Status: SHIPPED | OUTPATIENT
Start: 2019-09-04 | End: 2020-02-25

## 2019-09-04 NOTE — TELEPHONE ENCOUNTER
Hypertension Medications Protocol Passed9/3 7:15 PM   CMP or BMP in past 12 months    Last serum creatinine< 2.0    Appointment in past 6 or next 3 months     Requesting LISINOPRIL 20 MG  LOV: 4/29/19  RTC: 6 months  Last Relevant Labs: 11/15/18  Filled: 6

## 2019-09-12 ENCOUNTER — HOSPITAL ENCOUNTER (OUTPATIENT)
Dept: MAMMOGRAPHY | Age: 79
Discharge: HOME | End: 2019-09-12
Attending: PHYSICIAN ASSISTANT
Payer: MEDICARE

## 2019-09-12 DIAGNOSIS — R92.2 INCONCLUSIVE MAMMOGRAM: ICD-10-CM

## 2019-09-12 PROCEDURE — 77061 BREAST TOMOSYNTHESIS UNI: CPT | Performed by: PHYSICIAN ASSISTANT

## 2019-09-12 PROCEDURE — 77065 DX MAMMO INCL CAD UNI: CPT | Performed by: PHYSICIAN ASSISTANT

## 2019-10-03 ENCOUNTER — OFFICE VISIT (OUTPATIENT)
Dept: FAMILY MEDICINE CLINIC | Facility: CLINIC | Age: 79
End: 2019-10-03
Payer: MEDICARE

## 2019-10-03 VITALS
RESPIRATION RATE: 16 BRPM | BODY MASS INDEX: 36.32 KG/M2 | SYSTOLIC BLOOD PRESSURE: 126 MMHG | HEART RATE: 72 BPM | WEIGHT: 185 LBS | HEIGHT: 60 IN | TEMPERATURE: 98 F | DIASTOLIC BLOOD PRESSURE: 80 MMHG

## 2019-10-03 DIAGNOSIS — E78.5 DYSLIPIDEMIA: ICD-10-CM

## 2019-10-03 DIAGNOSIS — H91.91 HEARING LOSS OF RIGHT EAR, UNSPECIFIED HEARING LOSS TYPE: ICD-10-CM

## 2019-10-03 DIAGNOSIS — M85.859 OSTEOPENIA OF NECK OF FEMUR, UNSPECIFIED LATERALITY: ICD-10-CM

## 2019-10-03 DIAGNOSIS — Z23 NEED FOR VACCINATION: ICD-10-CM

## 2019-10-03 DIAGNOSIS — Z00.00 ENCOUNTER FOR ANNUAL HEALTH EXAMINATION: Primary | ICD-10-CM

## 2019-10-03 DIAGNOSIS — I10 ESSENTIAL HYPERTENSION: ICD-10-CM

## 2019-10-03 DIAGNOSIS — E55.9 VITAMIN D DEFICIENCY: ICD-10-CM

## 2019-10-03 DIAGNOSIS — Z13.31 DEPRESSION SCREENING: ICD-10-CM

## 2019-10-03 PROCEDURE — G0439 PPPS, SUBSEQ VISIT: HCPCS | Performed by: FAMILY MEDICINE

## 2019-10-03 PROCEDURE — 90662 IIV NO PRSV INCREASED AG IM: CPT | Performed by: FAMILY MEDICINE

## 2019-10-03 PROCEDURE — G0008 ADMIN INFLUENZA VIRUS VAC: HCPCS | Performed by: FAMILY MEDICINE

## 2019-10-03 PROCEDURE — G0444 DEPRESSION SCREEN ANNUAL: HCPCS | Performed by: FAMILY MEDICINE

## 2019-10-03 RX ORDER — ALENDRONATE SODIUM 70 MG/1
70 TABLET ORAL WEEKLY
Qty: 12 TABLET | Refills: 3 | Status: SHIPPED | OUTPATIENT
Start: 2019-10-03 | End: 2020-08-31

## 2019-10-03 RX ORDER — SIMVASTATIN 40 MG
40 TABLET ORAL NIGHTLY
Qty: 90 TABLET | Refills: 3 | Status: SHIPPED | OUTPATIENT
Start: 2019-10-03 | End: 2020-10-05

## 2019-10-03 NOTE — PATIENT INSTRUCTIONS
Konrad Baker's SCREENING SCHEDULE   Tests on this list are recommended by your physician but may not be covered, or covered at this frequency, by your insurer. Please check with your insurance carrier before scheduling to verify coverage.    PREVENTATIV are 73-68 years old and have smoked more than 100 cigarettes in their lifetime   • Anyone with a family history    Colorectal Cancer Screening  Covered up to Age 76     Colonoscopy Screen   Covered every 10 years- more often if abnormal There are no preven Immunizations      Influenza  Covered Annually Orders placed or performed in visit on 10/14/16   • FLU VACC [address] ANTIG   Orders placed or performed in visit on 10/22/14   • ADMIN INFLUENZA VIRUS VAC    Please get every year    Pneumococcal 13 (P Bulgarian)  [email or web address] org  This link also has information from the [address] [place] [address] regarding Advance Directives.

## 2019-10-03 NOTE — PROGRESS NOTES
HPI:   Adrian Jimenez is a 67year old female who presents for a Medicare Subsequent Annual Wellness visit (Pt already had Initial Annual Wellness).       Her last annual assessment has been over 1 year: Annual Physical due on 09/13/2019            Imms- handouts.     Do you have 3 or more medical conditions?: (P) 1-Yes  Have you fallen in the last 12 months?: (P) 0-No  Do you accidently lose urine?: (P) 1-Yes  Do you have difficulty seeing?: (P) 1-Yes  Do you have any difficulty walking or getting up?: (P) (83.5 kg)     Last Cholesterol Labs:   Lab Results   Component Value Date    CHOLEST 180 11/15/2018    HDL 90 (H) 11/15/2018    LDL 72 11/15/2018    TRIG 92 11/15/2018          Last Chemistry Labs:   Lab Results   Component Value Date    AST 17 11/15/2018 (06/27/2012), Loss of hearing, Osteopenia (2002), and Vitamin D deficiency.     She  has a past surgical history that includes colonoscopy (6/2002,  6/27/12, 12/22/17); cholecystectomy (5/17/1988); d & c (10/18/1969); and hc implant aneurysm clips (01/12/20 CHRONIC CONDITIONS:   Jhony Patel is a 67year old female who presents for a Medicare Assessment.      PLAN SUMMARY:   Diagnoses and all orders for this visit:    Encounter for annual health examination    Depression screening  -     DEPRESSION SCREEN AN Date Value   11/15/2018 97     GLUCOSE (mg/dL)   Date Value   08/06/2013 85          Cardiovascular Disease Screening     LDL Annually LDL Cholesterol (mg/dL)   Date Value   11/15/2018 72     LDL CHOLESTROL (mg/dL)   Date Value   08/06/2013 74        EKG renal disease   Hemophiliacs who received Factor VIII or IX concentrates   Clients of institutions for the mentally retarded   Persons who live in the same house as a HepB virus carrier   Homosexual men   Illicit injectable drug abusers     Tetanus Toxoid

## 2019-10-09 RX ORDER — ALENDRONATE SODIUM 70 MG/1
TABLET ORAL
Qty: 12 TABLET | Refills: 0 | OUTPATIENT
Start: 2019-10-09

## 2019-10-09 NOTE — TELEPHONE ENCOUNTER
Requested Prescriptions     Pending Prescriptions Disp Refills   • ALENDRONATE 70 MG Oral Tab [Pharmacy Med Name: ALENDRONATE 70MG TABLETS] 12 tablet 0     Sig: TAKE 1 TABLET BY MOUTH ONCE A WEEK     LOV: 10/03/19  RTC: when convenient  Last Relevant Labs:

## 2019-10-10 ENCOUNTER — OFFICE VISIT (OUTPATIENT)
Dept: FAMILY MEDICINE CLINIC | Facility: CLINIC | Age: 79
End: 2019-10-10
Payer: MEDICARE

## 2019-10-10 ENCOUNTER — APPOINTMENT (OUTPATIENT)
Dept: LAB | Age: 79
End: 2019-10-10
Attending: FAMILY MEDICINE
Payer: MEDICARE

## 2019-10-10 VITALS
OXYGEN SATURATION: 99 % | SYSTOLIC BLOOD PRESSURE: 124 MMHG | DIASTOLIC BLOOD PRESSURE: 80 MMHG | RESPIRATION RATE: 16 BRPM | BODY MASS INDEX: 36.32 KG/M2 | WEIGHT: 185 LBS | HEART RATE: 72 BPM | TEMPERATURE: 98 F | HEIGHT: 60 IN

## 2019-10-10 DIAGNOSIS — M85.859 OSTEOPENIA OF NECK OF FEMUR, UNSPECIFIED LATERALITY: ICD-10-CM

## 2019-10-10 DIAGNOSIS — E55.9 VITAMIN D DEFICIENCY: ICD-10-CM

## 2019-10-10 DIAGNOSIS — H72.91 PERFORATED TYMPANIC MEMBRANE, RIGHT: ICD-10-CM

## 2019-10-10 DIAGNOSIS — E78.5 DYSLIPIDEMIA: ICD-10-CM

## 2019-10-10 DIAGNOSIS — R05.3 CHRONIC COUGH: Primary | ICD-10-CM

## 2019-10-10 DIAGNOSIS — I10 ESSENTIAL HYPERTENSION: ICD-10-CM

## 2019-10-10 DIAGNOSIS — K21.9 GASTROESOPHAGEAL REFLUX DISEASE, ESOPHAGITIS PRESENCE NOT SPECIFIED: ICD-10-CM

## 2019-10-10 DIAGNOSIS — R05.3 CHRONIC COUGH: ICD-10-CM

## 2019-10-10 PROCEDURE — 80061 LIPID PANEL: CPT

## 2019-10-10 PROCEDURE — 82306 VITAMIN D 25 HYDROXY: CPT

## 2019-10-10 PROCEDURE — 99213 OFFICE O/P EST LOW 20 MIN: CPT | Performed by: FAMILY MEDICINE

## 2019-10-10 PROCEDURE — 80053 COMPREHEN METABOLIC PANEL: CPT

## 2019-10-10 PROCEDURE — 36415 COLL VENOUS BLD VENIPUNCTURE: CPT

## 2019-10-10 RX ORDER — OMEPRAZOLE 20 MG/1
CAPSULE, DELAYED RELEASE ORAL
Qty: 90 CAPSULE | Refills: 0 | OUTPATIENT
Start: 2019-10-10

## 2019-10-10 RX ORDER — OMEPRAZOLE 20 MG/1
20 CAPSULE, DELAYED RELEASE ORAL
Qty: 30 CAPSULE | Refills: 0 | Status: SHIPPED | OUTPATIENT
Start: 2019-10-10 | End: 2019-11-05

## 2019-10-10 NOTE — PROGRESS NOTES
[facility] Group Visit Note  10/10/2019      Subjective:      Patient ID: Francisca Salinas is a 67year old female.     Chief Complaint:  Patient presents with:  Cough: states she has a dry cough that is worse when lying down for approx 30 years      HPI: Dispense: 30 capsule; Refill: 0  -if this is not helpful to consider changing her lisinopril 20mg to losartan 100mg and f/u in 1wk      2.  Gastroesophageal reflux disease, esophagitis presence not specified  - omeprazole 20 MG Oral Capsule Delayed Release;

## 2019-10-10 NOTE — TELEPHONE ENCOUNTER
Requested Prescriptions     Pending Prescriptions Disp Refills   • OMEPRAZOLE 20 MG Oral Capsule Delayed Release [Pharmacy Med Name: OMEPRAZOLE 20MG CAPSULES] 90 capsule 0     Sig: TAKE 1 CAPSULE(20 MG) BY MOUTH EVERY MORNING BEFORE BREAKFAST     LOV: 10/1

## 2019-10-10 NOTE — PATIENT INSTRUCTIONS
Tips to Control Acid Reflux    To control acid reflux, you’ll need to make some basic diet and lifestyle changes. The simple steps outlined below may be all you’ll need to ease discomfort. Watch what you eat  · Avoid fatty foods and spicy foods.   · Eat Symptoms of reflux include burning, pressure or sharp pain in the upper abdomen or mid to lower chest. The pain can spread to the neck, back, or shoulder.  There may be belching, an acid taste in the back of the throat, chronic cough, or sore throat, or lilia · Acid inhibitors (proton pump inhibitors PPIs) to decrease acid production in a different way than the blockers. They may work better, but can take a little longer to take effect.   Take an antacid 30 to 60 minutes after eating and at bedtime, but not at t Reflux is more likely to strike when you’re lying down flat, because stomach fluid can flow backward more easily. Raising the head of your bed 4 to 6 inches can help. To do this:  · Slide blocks or books under the legs at the head of your bed.  Or, place a

## 2019-10-28 ENCOUNTER — TELEPHONE (OUTPATIENT)
Dept: FAMILY MEDICINE CLINIC | Facility: CLINIC | Age: 79
End: 2019-10-28

## 2019-10-28 NOTE — TELEPHONE ENCOUNTER
- Pt is calling with an update on how medication is working.     omeprazole 20 MG Oral Capsule Delayed Release 30 capsule 0 10/10/2019    Sig:   Take 1 capsule (20 mg total) by mouth every morning before breakfast.     Route:   Oral       Medicati

## 2019-10-28 NOTE — TELEPHONE ENCOUNTER
Patient is concerned with possible bone fractures, pt is currently on Alendronate and is concerned that omeprazole may decrease her bone density.

## 2019-11-05 ENCOUNTER — TELEPHONE (OUTPATIENT)
Dept: FAMILY MEDICINE CLINIC | Facility: CLINIC | Age: 79
End: 2019-11-05

## 2019-11-05 DIAGNOSIS — K21.9 GASTROESOPHAGEAL REFLUX DISEASE, ESOPHAGITIS PRESENCE NOT SPECIFIED: ICD-10-CM

## 2019-11-05 DIAGNOSIS — R05.3 CHRONIC COUGH: ICD-10-CM

## 2019-11-05 RX ORDER — OMEPRAZOLE 20 MG/1
20 CAPSULE, DELAYED RELEASE ORAL
Qty: 90 CAPSULE | Refills: 3 | Status: SHIPPED | OUTPATIENT
Start: 2019-11-05 | End: 2020-10-05

## 2019-11-05 NOTE — TELEPHONE ENCOUNTER
Pt is calling in regarding to her previous call on 10/28/19- patient has not received call and is concerned with taking meds together.

## 2019-11-05 NOTE — TELEPHONE ENCOUNTER
Pt recalling. 10/28/19 TE:    Patient is concerned with possible bone fractures, pt is currently on Alendronate and is concerned that omeprazole may decrease her bone density.     10/10/19 last OV Dr. Sergio Singh cough/GERD/perforated TM  10/3/19 las

## 2019-11-05 NOTE — TELEPHONE ENCOUNTER
Spoke with patient. Studies have shown decreased bone density in animals, but conflicting in humans. She has osteopenia, but high risk for fractures. I discussed what whe do depends on the risks/benefits of the med.  Options of changing to famotidine (pepci

## 2020-02-24 DIAGNOSIS — I10 ESSENTIAL HYPERTENSION WITH GOAL BLOOD PRESSURE LESS THAN 130/80: ICD-10-CM

## 2020-02-25 RX ORDER — LISINOPRIL 20 MG/1
TABLET ORAL
Qty: 90 TABLET | Refills: 1 | Status: SHIPPED | OUTPATIENT
Start: 2020-02-25 | End: 2020-08-24

## 2020-02-25 NOTE — TELEPHONE ENCOUNTER
Hypertension Medications Protocol Passed2/24 9:42 PM   CMP or BMP in past 12 months    Last serum creatinine< 2.0    Appointment in past 6 or next 3 months     Requesting LISINOPRIL 20 MG   LOV: 10/10/19  RTC:   Last Relevant Labs: 10/10/19  Filled: 9/4/19

## 2020-03-19 ENCOUNTER — HOSPITAL ENCOUNTER (OUTPATIENT)
Dept: MAMMOGRAPHY | Age: 80
Discharge: HOME | End: 2020-03-19
Attending: PHYSICIAN ASSISTANT
Payer: MEDICARE

## 2020-03-19 DIAGNOSIS — R92.1 BREAST CALCIFICATION, LEFT: ICD-10-CM

## 2020-03-19 PROCEDURE — 77065 DX MAMMO INCL CAD UNI: CPT | Performed by: PHYSICIAN ASSISTANT

## 2020-03-19 PROCEDURE — 77061 BREAST TOMOSYNTHESIS UNI: CPT | Performed by: PHYSICIAN ASSISTANT

## 2020-07-03 ENCOUNTER — OFFICE VISIT (OUTPATIENT)
Dept: PODIATRY CLINIC | Facility: CLINIC | Age: 80
End: 2020-07-03
Payer: MEDICARE

## 2020-07-03 VITALS — TEMPERATURE: 99 F

## 2020-07-03 DIAGNOSIS — M79.675 PAIN IN TOES OF BOTH FEET: ICD-10-CM

## 2020-07-03 DIAGNOSIS — M20.41 HAMMER TOES OF BOTH FEET: ICD-10-CM

## 2020-07-03 DIAGNOSIS — M20.42 HAMMER TOES OF BOTH FEET: ICD-10-CM

## 2020-07-03 DIAGNOSIS — L84 HELOMA DURUM: ICD-10-CM

## 2020-07-03 DIAGNOSIS — M72.2 PLANTAR FASCIITIS OF RIGHT FOOT: ICD-10-CM

## 2020-07-03 DIAGNOSIS — B35.1 ONYCHOMYCOSIS: ICD-10-CM

## 2020-07-03 DIAGNOSIS — M79.671 RIGHT FOOT PAIN: Primary | ICD-10-CM

## 2020-07-03 DIAGNOSIS — M79.674 PAIN IN TOES OF BOTH FEET: ICD-10-CM

## 2020-07-03 DIAGNOSIS — L60.0 ONYCHOCRYPTOSIS: ICD-10-CM

## 2020-07-03 PROCEDURE — 99213 OFFICE O/P EST LOW 20 MIN: CPT | Performed by: PODIATRIST

## 2020-07-03 PROCEDURE — 11721 DEBRIDE NAIL 6 OR MORE: CPT | Performed by: PODIATRIST

## 2020-07-05 NOTE — PROGRESS NOTES
Adonica Klinefelter is a 79year old female. Patient presents with: Foot Pain: right -- Rates pain 3/10. No xrays taken. Onset 3 weeks ago while exercising. Pain on lateral right foot and radiates to lateral ankle.    Toe Pain: right second toe is bent up  Ingr 1,500 mg by mouth. • Cholecalciferol (VITAMIN D3) 1000 units Oral Cap Take 1 tablet by mouth 3 (three) times daily.         Past Medical History:   Diagnosis Date   • Carotid artery stenosis     <50% B on 3/19 US   • Cerebral aneurysm 2014    s/p repa and Sexual Activity      Alcohol use: Yes        Comment: rare      Drug use: No    Other Topics      Concerns:        Caffeine Concern: No          2 cup tea daily        Exercise: Yes          REVIEW OF SYSTEMS:   Review of Systems  GENERAL HEALTH: feels Sha Capps DPM

## 2020-07-31 ENCOUNTER — OFFICE VISIT (OUTPATIENT)
Dept: PODIATRY CLINIC | Facility: CLINIC | Age: 80
End: 2020-07-31
Payer: MEDICARE

## 2020-07-31 DIAGNOSIS — M20.41 HAMMER TOES OF BOTH FEET: ICD-10-CM

## 2020-07-31 DIAGNOSIS — M20.42 HAMMER TOES OF BOTH FEET: ICD-10-CM

## 2020-07-31 DIAGNOSIS — M72.2 PLANTAR FASCIITIS OF RIGHT FOOT: ICD-10-CM

## 2020-07-31 DIAGNOSIS — L84 HELOMA DURUM: ICD-10-CM

## 2020-07-31 DIAGNOSIS — M79.671 RIGHT FOOT PAIN: Primary | ICD-10-CM

## 2020-07-31 PROCEDURE — 99213 OFFICE O/P EST LOW 20 MIN: CPT | Performed by: PODIATRIST

## 2020-08-02 NOTE — PROGRESS NOTES
Allen Menard is a 79year old female. Patient presents with: Follow - Up: Right foot, patient has been wearing the cam boot. Patient states that she has zero pain. Corn: Bilateral feet, 5th digit.         HPI:   Patient returns to the clinic her right <50% B on 3/19 US   • Cerebral aneurysm 2014    s/p repair, Complex 6.5 x 3.5 x 3.5 mm right MCA bifurcation aneurysm   • Cervical disc disease     stenosis   • DDD (degenerative disc disease), thoracic 08/15/2013   • Dyslipidemia    • Essential hypertensi OF SYSTEMS:   Today reviewed systens as documented below  GENERAL HEALTH: feels well otherwise  SKIN: Refer to exam below  RESPIRATORY: denies shortness of breath with exertion  CARDIOVASCULAR: denies chest pain on exertion  GI: denies abdominal pain and d

## 2020-08-21 DIAGNOSIS — I10 ESSENTIAL HYPERTENSION WITH GOAL BLOOD PRESSURE LESS THAN 130/80: ICD-10-CM

## 2020-08-24 RX ORDER — LISINOPRIL 20 MG/1
TABLET ORAL
Qty: 90 TABLET | Refills: 0 | Status: SHIPPED | OUTPATIENT
Start: 2020-08-24 | End: 2020-10-05

## 2020-08-24 NOTE — TELEPHONE ENCOUNTER
LISINOPRIL 20MG TABLETS          Will file in chart as: LISINOPRIL 20 MG Oral Tab         Sig: TAKE 1 TABLET(20 MG) BY MOUTH EVERY DAY    Disp:  90 tablet    Refills:  1 (Pharmacy requested: Not specified)    Start: 8/21/2020    Class: Normal    Non-formul

## 2020-08-31 DIAGNOSIS — E55.9 VITAMIN D DEFICIENCY: ICD-10-CM

## 2020-08-31 DIAGNOSIS — Z78.0 ASYMPTOMATIC MENOPAUSAL STATE: Primary | ICD-10-CM

## 2020-08-31 DIAGNOSIS — M85.859 OSTEOPENIA OF NECK OF FEMUR, UNSPECIFIED LATERALITY: ICD-10-CM

## 2020-08-31 DIAGNOSIS — Z00.00 LABORATORY EXAM ORDERED AS PART OF ROUTINE GENERAL MEDICAL EXAMINATION: ICD-10-CM

## 2020-08-31 DIAGNOSIS — E78.5 DYSLIPIDEMIA: ICD-10-CM

## 2020-09-03 RX ORDER — ALENDRONATE SODIUM 70 MG/1
70 TABLET ORAL WEEKLY
Qty: 12 TABLET | Refills: 0 | Status: SHIPPED | OUTPATIENT
Start: 2020-09-03 | End: 2020-10-05

## 2020-09-03 NOTE — TELEPHONE ENCOUNTER
I refilled her alendronate, but is due to repeat her bone density test on 9/22 or after. I placed orders for this and her fasting blood work.  Please instruct her on how to get these done and to see me afterwards for her yearly wellness visit and can review them

## 2020-10-05 ENCOUNTER — OFFICE VISIT (OUTPATIENT)
Dept: FAMILY MEDICINE CLINIC | Facility: CLINIC | Age: 80
End: 2020-10-05
Payer: MEDICARE

## 2020-10-05 VITALS
TEMPERATURE: 97 F | HEIGHT: 60 IN | SYSTOLIC BLOOD PRESSURE: 130 MMHG | DIASTOLIC BLOOD PRESSURE: 72 MMHG | HEART RATE: 74 BPM | RESPIRATION RATE: 16 BRPM | BODY MASS INDEX: 36.02 KG/M2 | WEIGHT: 183.5 LBS

## 2020-10-05 DIAGNOSIS — K21.9 GASTROESOPHAGEAL REFLUX DISEASE: ICD-10-CM

## 2020-10-05 DIAGNOSIS — Z23 NEED FOR VACCINATION: ICD-10-CM

## 2020-10-05 DIAGNOSIS — I10 ESSENTIAL HYPERTENSION: ICD-10-CM

## 2020-10-05 DIAGNOSIS — Z00.00 ENCOUNTER FOR ANNUAL HEALTH EXAMINATION: Primary | ICD-10-CM

## 2020-10-05 DIAGNOSIS — E78.5 DYSLIPIDEMIA: ICD-10-CM

## 2020-10-05 DIAGNOSIS — M85.859 OSTEOPENIA OF NECK OF FEMUR, UNSPECIFIED LATERALITY: ICD-10-CM

## 2020-10-05 DIAGNOSIS — I67.1 CEREBRAL ANEURYSM WITHOUT RUPTURE: ICD-10-CM

## 2020-10-05 PROCEDURE — G0439 PPPS, SUBSEQ VISIT: HCPCS | Performed by: FAMILY MEDICINE

## 2020-10-05 PROCEDURE — 90662 IIV NO PRSV INCREASED AG IM: CPT | Performed by: FAMILY MEDICINE

## 2020-10-05 PROCEDURE — G0008 ADMIN INFLUENZA VIRUS VAC: HCPCS | Performed by: FAMILY MEDICINE

## 2020-10-05 RX ORDER — SIMVASTATIN 40 MG
40 TABLET ORAL NIGHTLY
Qty: 90 TABLET | Refills: 1 | Status: SHIPPED | OUTPATIENT
Start: 2020-10-05 | End: 2021-05-12

## 2020-10-05 RX ORDER — ALENDRONATE SODIUM 70 MG/1
70 TABLET ORAL WEEKLY
Qty: 12 TABLET | Refills: 1 | Status: SHIPPED | OUTPATIENT
Start: 2020-10-05 | End: 2021-05-12

## 2020-10-05 RX ORDER — LISINOPRIL 20 MG/1
20 TABLET ORAL DAILY
Qty: 90 TABLET | Refills: 1 | Status: SHIPPED | OUTPATIENT
Start: 2020-10-05 | End: 2021-05-20

## 2020-10-05 RX ORDER — OMEPRAZOLE 20 MG/1
20 CAPSULE, DELAYED RELEASE ORAL
Qty: 90 CAPSULE | Refills: 1 | Status: SHIPPED | OUTPATIENT
Start: 2020-10-05 | End: 2021-04-30

## 2020-10-05 NOTE — PROGRESS NOTES
HPI:   Adrian Jimenez is a 79year old female who presents for a Medicare Subsequent Annual Wellness visit (Pt already had Initial Annual Wellness). Due for labs and DEXA, otherwise UTD on health maintenance. Flu shot today.   Diet is healthy, walks f smoked tobacco.  Ms. Augustina Luong already takes aspirin and has it on her medication list.   CAGE Alcohol screening   Dorothy Villegas was screened for Alcohol abuse and had a score of 0 so is at low risk.     Patient Care Team: Patient Care Team:  Roselia Elkins mouth nightly. •  Cholecalciferol (VITAMIN D3) 1000 units Oral Cap, Take 1 tablet by mouth 3 (three) times daily. •  Misc Natural Products (GLUCOSAMINE CHONDROITIN TRIPLE) Oral Tab, Take 1 tablet by mouth 3 (three) times daily.  TriFlex/Glucosamine 50 on exertion  GI: denies abdominal pain, denies heartburn  : denies dysuria, vaginal discharge or itching, no complaint of urinary incontinence   MUSCULOSKELETAL: denies back pain  NEURO: denies headaches  PSYCHE: denies depression or anxiety  HEMATOLOGIC supraclavicular, and axillary nodes normal   Neurologic: Normal       Vaccination History     Immunization History   Administered Date(s) Administered   • FLU VAC High Dose 65 YRS & Older PRSV Free (35107) 10/03/2019, 10/05/2020   • FLUAD High Dose 73 yr a plan.  Reinforced healthy diet, lifestyle, and exercise. Return in 6 months (on 4/5/2021).      Rebekah Waldron, DO, 10/5/2020     General Health     In the past six months, have you lost more than 10 pounds without trying?: 2 - No  Has your appetite bee age 21-68 or Pap+HPV every 5 yrs age 33-67, age 72 and older at high risk There are no preventive care reminders to display for this patient.  Update Health Maintenance if applicable    Chlamydia  Annually if high risk No results found for: CHLAMYDIA No thiago Value   10/10/2019 0.94    No flowsheet data found. Drug Serum Conc  Annually No results found for: DIGOXIN, DIG, VALP No flowsheet data found.                  Template: [employer] SELF MEDICARE ANNUAL ASSESSMENT FEMALE [65703]

## 2020-10-05 NOTE — PATIENT INSTRUCTIONS
Schedule blood tests and bone (DEXA) scan online or by calling Central Scheduling [address]   Tests on this list are recommended by your physician but may not be covered, or covered at this frequency, by your i this or any previous visit.  Limited to patients who meet one of the following criteria:   • Men who are 73-68 years old and have smoked more than 100 cigarettes in their lifetime   • Anyone with a family history    Colorectal Cancer Screening  Covered up t preventive care reminders to display for this patient.  Please get this Mammogram regularly   Immunizations      Influenza  Covered Annually Orders placed or performed in visit on 10/03/19   • FLU VACC HIGH DOSE PRSV FREE   Orders placed or performed in vis Directives. It also has the State forms available on it's website for anyone to review and print using their home computer and printer. (the forms are also available in [address])  [email or web address]  This link also has information from the [employer]

## 2020-10-08 ENCOUNTER — LAB ENCOUNTER (OUTPATIENT)
Dept: LAB | Age: 80
End: 2020-10-08
Attending: FAMILY MEDICINE
Payer: MEDICARE

## 2020-10-08 DIAGNOSIS — E55.9 VITAMIN D DEFICIENCY: ICD-10-CM

## 2020-10-08 DIAGNOSIS — Z00.00 ENCOUNTER FOR ANNUAL HEALTH EXAMINATION: ICD-10-CM

## 2020-10-08 DIAGNOSIS — Z00.00 LABORATORY EXAM ORDERED AS PART OF ROUTINE GENERAL MEDICAL EXAMINATION: ICD-10-CM

## 2020-10-08 DIAGNOSIS — E78.5 DYSLIPIDEMIA: ICD-10-CM

## 2020-10-08 PROCEDURE — 84443 ASSAY THYROID STIM HORMONE: CPT

## 2020-10-08 PROCEDURE — 80061 LIPID PANEL: CPT

## 2020-10-08 PROCEDURE — 80053 COMPREHEN METABOLIC PANEL: CPT

## 2020-10-08 PROCEDURE — 36415 COLL VENOUS BLD VENIPUNCTURE: CPT

## 2020-10-08 PROCEDURE — 82306 VITAMIN D 25 HYDROXY: CPT

## 2020-10-08 PROCEDURE — 83036 HEMOGLOBIN GLYCOSYLATED A1C: CPT

## 2020-10-15 ENCOUNTER — HOSPITAL ENCOUNTER (OUTPATIENT)
Dept: MAMMOGRAPHY | Age: 80
Discharge: HOME | End: 2020-10-15
Attending: PHYSICIAN ASSISTANT
Payer: MEDICARE

## 2020-10-15 ENCOUNTER — HOSPITAL ENCOUNTER (OUTPATIENT)
Dept: BONE DENSITY | Age: 80
Discharge: HOME | End: 2020-10-15
Attending: FAMILY MEDICINE
Payer: MEDICARE

## 2020-10-15 DIAGNOSIS — Z78.0 ASYMPTOMATIC MENOPAUSAL STATE: ICD-10-CM

## 2020-10-15 DIAGNOSIS — R92.1 CALCIFICATION OF LEFT BREAST: ICD-10-CM

## 2020-10-15 DIAGNOSIS — M85.859 OSTEOPENIA OF NECK OF FEMUR, UNSPECIFIED LATERALITY: ICD-10-CM

## 2020-10-15 PROCEDURE — 77066 DX MAMMO INCL CAD BI: CPT | Performed by: PHYSICIAN ASSISTANT

## 2020-10-15 PROCEDURE — 77080 DXA BONE DENSITY AXIAL: CPT | Performed by: FAMILY MEDICINE

## 2020-10-15 PROCEDURE — 77062 BREAST TOMOSYNTHESIS BI: CPT | Performed by: PHYSICIAN ASSISTANT

## 2020-10-26 DIAGNOSIS — K21.9 GASTROESOPHAGEAL REFLUX DISEASE: ICD-10-CM

## 2020-10-26 RX ORDER — OMEPRAZOLE 20 MG/1
CAPSULE, DELAYED RELEASE ORAL
Qty: 90 CAPSULE | Refills: 1 | OUTPATIENT
Start: 2020-10-26

## 2020-10-26 NOTE — TELEPHONE ENCOUNTER
OMEPRAZOLE 20MG CAPSULES          Will file in chart as: OMEPRAZOLE 20 MG Oral Capsule Delayed Release    Sig: TAKE 1 CAPSULE(20 MG) BY MOUTH EVERY MORNING BEFORE BREAKFAST.     Disp:  90 capsule    Refills:  1 (Pharmacy requested: Not specified)    Start:

## 2021-03-03 DIAGNOSIS — Z23 NEED FOR VACCINATION: ICD-10-CM

## 2021-03-11 ENCOUNTER — TELEPHONE (OUTPATIENT)
Dept: FAMILY MEDICINE CLINIC | Facility: CLINIC | Age: 81
End: 2021-03-11

## 2021-03-11 NOTE — TELEPHONE ENCOUNTER
Pt called to let physician know that she has received both of her COVID 19 vaccines. They were done through [facility] she lives. She had Moderna-first dose done on January 29, 2021 and second dose on February 26, 2021. Please up date patient chart.

## 2021-04-27 DIAGNOSIS — K21.9 GASTROESOPHAGEAL REFLUX DISEASE: ICD-10-CM

## 2021-04-30 RX ORDER — OMEPRAZOLE 20 MG/1
20 CAPSULE, DELAYED RELEASE ORAL
Qty: 90 CAPSULE | Refills: 0 | Status: SHIPPED | OUTPATIENT
Start: 2021-04-30 | End: 2021-08-04

## 2021-05-11 DIAGNOSIS — E78.5 DYSLIPIDEMIA: ICD-10-CM

## 2021-05-11 DIAGNOSIS — M85.859 OSTEOPENIA OF NECK OF FEMUR, UNSPECIFIED LATERALITY: ICD-10-CM

## 2021-05-12 NOTE — TELEPHONE ENCOUNTER
Requested Prescriptions     Name from pharmacy: SIMVASTATIN 40MG TABLETS         Will file in chart as: SIMVASTATIN 40 MG Oral Tab    Sig: TAKE 1 TABLET(40 MG) BY MOUTH EVERY NIGHT    Disp:  90 tablet    Refills:  1 (Pharmacy requested: Not specified)    S

## 2021-05-12 NOTE — TELEPHONE ENCOUNTER
Future Appointments   Date Time Provider Ema Fernanda   5/20/2021 10:00 AM Lester Marroquin MD [facility] [address] [facility] [address]     PLS refill meds

## 2021-05-14 RX ORDER — ALENDRONATE SODIUM 70 MG/1
70 TABLET ORAL WEEKLY
Qty: 12 TABLET | Refills: 1 | Status: SHIPPED | OUTPATIENT
Start: 2021-05-14 | End: 2021-11-02

## 2021-05-14 RX ORDER — SIMVASTATIN 40 MG
40 TABLET ORAL NIGHTLY
Qty: 90 TABLET | Refills: 1 | Status: SHIPPED | OUTPATIENT
Start: 2021-05-14 | End: 2021-11-06

## 2021-05-18 DIAGNOSIS — I10 ESSENTIAL HYPERTENSION: ICD-10-CM

## 2021-05-19 NOTE — TELEPHONE ENCOUNTER
Requested Prescriptions     Name from pharmacy: LISINOPRIL 20MG TABLETS         Will file in chart as: LISINOPRIL 20 MG Oral Tab    Sig: TAKE 1 TABLET(20 MG) BY MOUTH DAILY    Disp:  90 tablet    Refills:  1 (Pharmacy requested: Not specified)    Start: 5/

## 2021-05-20 ENCOUNTER — OFFICE VISIT (OUTPATIENT)
Dept: FAMILY MEDICINE CLINIC | Facility: CLINIC | Age: 81
End: 2021-05-20
Payer: MEDICARE

## 2021-05-20 VITALS
SYSTOLIC BLOOD PRESSURE: 132 MMHG | RESPIRATION RATE: 16 BRPM | WEIGHT: 182 LBS | BODY MASS INDEX: 35.73 KG/M2 | TEMPERATURE: 98 F | DIASTOLIC BLOOD PRESSURE: 70 MMHG | HEART RATE: 75 BPM | OXYGEN SATURATION: 98 % | HEIGHT: 60 IN

## 2021-05-20 DIAGNOSIS — I10 ESSENTIAL HYPERTENSION: ICD-10-CM

## 2021-05-20 PROCEDURE — 99213 OFFICE O/P EST LOW 20 MIN: CPT | Performed by: FAMILY MEDICINE

## 2021-05-20 RX ORDER — LISINOPRIL 20 MG/1
20 TABLET ORAL DAILY
Qty: 90 TABLET | Refills: 1 | Status: SHIPPED | OUTPATIENT
Start: 2021-05-20 | End: 2021-11-15

## 2021-05-20 RX ORDER — LISINOPRIL 20 MG/1
TABLET ORAL
Qty: 90 TABLET | Refills: 1 | OUTPATIENT
Start: 2021-05-20

## 2021-05-20 NOTE — PROGRESS NOTES
[facility] Visit Note  5/20/2021      Subjective:      Patient ID: Phani Gillette is a [de-identified]year old female. Chief Complaint:  Patient presents with:  Blood Pressure: routine ckup, refill on Lisinopril.       HPI:  Phani Gillette is a [de-identified]year old

## 2021-08-01 DIAGNOSIS — K21.9 GASTROESOPHAGEAL REFLUX DISEASE: ICD-10-CM

## 2021-08-04 RX ORDER — OMEPRAZOLE 20 MG/1
20 CAPSULE, DELAYED RELEASE ORAL
Qty: 90 CAPSULE | Refills: 1 | Status: SHIPPED | OUTPATIENT
Start: 2021-08-04 | End: 2022-01-12

## 2021-11-01 DIAGNOSIS — M85.859 OSTEOPENIA OF NECK OF FEMUR, UNSPECIFIED LATERALITY: ICD-10-CM

## 2021-11-02 RX ORDER — ALENDRONATE SODIUM 70 MG/1
70 TABLET ORAL WEEKLY
Qty: 12 TABLET | Refills: 3 | Status: SHIPPED | OUTPATIENT
Start: 2021-11-02

## 2021-11-02 NOTE — TELEPHONE ENCOUNTER
ALENDRONATE 70MG TABLETS          Will file in chart as: ALENDRONATE 70 MG Oral Tab    Sig: TAKE 1 TABLET(70 MG) BY MOUTH 1 TIME A WEEK    Disp:  12 tablet    Refills:  1 (Pharmacy requested: Not specified)    Start: 11/1/2021    Class: Normal    Non-formu

## 2021-11-05 DIAGNOSIS — E78.5 DYSLIPIDEMIA: ICD-10-CM

## 2021-11-06 RX ORDER — SIMVASTATIN 40 MG
TABLET ORAL
Qty: 90 TABLET | Refills: 1 | Status: SHIPPED | OUTPATIENT
Start: 2021-11-06

## 2021-11-14 DIAGNOSIS — I10 ESSENTIAL HYPERTENSION: ICD-10-CM

## 2021-11-15 RX ORDER — LISINOPRIL 20 MG/1
TABLET ORAL
Qty: 90 TABLET | Refills: 0 | Status: SHIPPED | OUTPATIENT
Start: 2021-11-15 | End: 2021-11-22

## 2021-11-15 NOTE — TELEPHONE ENCOUNTER
LISINOPRIL 20MG TABLETS          Will file in chart as: LISINOPRIL 20 MG Oral Tab    Sig: TAKE 1 TABLET(20 MG) BY MOUTH DAILY    Disp:  90 tablet    Refills:  1 (Pharmacy requested: Not specified)    Start: 11/14/2021    Class: Normal    Non-formulary For:

## 2021-11-22 ENCOUNTER — OFFICE VISIT (OUTPATIENT)
Dept: FAMILY MEDICINE CLINIC | Facility: CLINIC | Age: 81
End: 2021-11-22
Payer: MEDICARE

## 2021-11-22 VITALS
RESPIRATION RATE: 16 BRPM | WEIGHT: 183 LBS | SYSTOLIC BLOOD PRESSURE: 130 MMHG | HEIGHT: 60 IN | HEART RATE: 74 BPM | DIASTOLIC BLOOD PRESSURE: 70 MMHG | OXYGEN SATURATION: 98 % | TEMPERATURE: 98 F | BODY MASS INDEX: 35.93 KG/M2

## 2021-11-22 DIAGNOSIS — Z23 NEED FOR VACCINATION: ICD-10-CM

## 2021-11-22 DIAGNOSIS — Z00.00 ENCOUNTER FOR ANNUAL HEALTH EXAMINATION: Primary | ICD-10-CM

## 2021-11-22 DIAGNOSIS — Z00.00 LABORATORY EXAM ORDERED AS PART OF ROUTINE GENERAL MEDICAL EXAMINATION: ICD-10-CM

## 2021-11-22 DIAGNOSIS — E78.5 DYSLIPIDEMIA: ICD-10-CM

## 2021-11-22 DIAGNOSIS — Z13.31 DEPRESSION SCREENING: ICD-10-CM

## 2021-11-22 DIAGNOSIS — R06.02 SOB (SHORTNESS OF BREATH) ON EXERTION: ICD-10-CM

## 2021-11-22 DIAGNOSIS — E55.9 VITAMIN D DEFICIENCY: ICD-10-CM

## 2021-11-22 DIAGNOSIS — I10 ESSENTIAL HYPERTENSION: ICD-10-CM

## 2021-11-22 DIAGNOSIS — R73.01 IFG (IMPAIRED FASTING GLUCOSE): ICD-10-CM

## 2021-11-22 DIAGNOSIS — R05.9 COUGH: ICD-10-CM

## 2021-11-22 DIAGNOSIS — H91.93 BILATERAL HEARING LOSS, UNSPECIFIED HEARING LOSS TYPE: ICD-10-CM

## 2021-11-22 PROCEDURE — G0008 ADMIN INFLUENZA VIRUS VAC: HCPCS | Performed by: FAMILY MEDICINE

## 2021-11-22 PROCEDURE — G0439 PPPS, SUBSEQ VISIT: HCPCS | Performed by: FAMILY MEDICINE

## 2021-11-22 PROCEDURE — G0444 DEPRESSION SCREEN ANNUAL: HCPCS | Performed by: FAMILY MEDICINE

## 2021-11-22 PROCEDURE — 90662 IIV NO PRSV INCREASED AG IM: CPT | Performed by: FAMILY MEDICINE

## 2021-11-22 RX ORDER — LISINOPRIL 20 MG/1
20 TABLET ORAL DAILY
Qty: 90 TABLET | Refills: 1 | Status: SHIPPED | OUTPATIENT
Start: 2021-11-22

## 2021-11-22 NOTE — PATIENT INSTRUCTIONS
Debara Fothergill [employer] SCREENING SCHEDULE   Tests on this list are recommended by your physician but may not be covered, or covered at this frequency, by your insurer. Please check with your insurance carrier before scheduling to verify coverage.    PREVENTAT 10/15/2020      No recommendations at this time   Pap and Pelvic    Pap   Covered every 2 years for women at normal risk;  Annually if at high risk -  No recommendations at this time    Chlamydia Annually if high risk -  No recommendations at this time   Sc link to the [email or web address]. This site has a lot of good information including definitions of the different types of Advance Directives.  It also has the State forms available on it's website for anyone to review and print using their home co

## 2021-11-22 NOTE — PROGRESS NOTES
HPI:   Vance Pallas is a [de-identified]year old female who presents for a Medicare Subsequent Annual Wellness visit (Pt already had Initial Annual Wellness).       Her last annual assessment has been over 1 year: Annual Physical due on 10/05/2021            Imms- Re 40mg, due for labs    Cough- takes omeprazole 20mg daily. SOB going up hill, but not going up stairs b/c can't go up fast enough with her OA. Mild runny nose, but says past allergy testing was negative.  Will do 2 wk trial on omeprazole 20mg 2# and call karon Encounters:  11/22/21 : 183 lb (83 kg)  05/20/21 : 182 lb (82.6 kg)  10/05/20 : 183 lb 8 oz (83.2 kg)     Last Cholesterol Labs:   Lab Results   Component Value Date    CHOLEST 171 10/08/2020    HDL 85 (H) 10/08/2020    LDL 66 10/08/2020    TRIG 99 10/08/2 (08/15/2013), Dyslipidemia, Essential hypertension, Hyperplastic colonic polyp (06/27/2012), Loss of hearing, Osteopenia (2002), and Vitamin D deficiency.     She  has a past surgical history that includes colonoscopy (6/2002,  6/27/12, 12/22/17); cholecyst thyromegaly or masses. PULMONARY:  Lungs clear to auscultation bilaterally. No wheezes, rales, or rhonchi. CARDIOVASCULAR:  Regular rate and rhythm. No murmurs, gallops, or rubs. ABDOMEN:  + bowel sounds, soft, nontender, nondistended.  No hepatosplenome hypertension  -     CBC WITH DIFFERENTIAL WITH PLATELET; Future  -     COMP METABOLIC PANEL (14); Future  -     lisinopril 20 MG Oral Tab; Take 1 tablet (20 mg total) by mouth daily.     IFG (impaired fasting glucose)  -     HEMOGLOBIN A1C; Future    Cough CHOLEST 171 10/08/2020    HDL 85 (H) 10/08/2020    LDL 66 10/08/2020    TRIG 99 10/08/2020         Electrocardiogram (EKG)   Covered if needed at Welcome to Medicare, and non-screening if indicated for medical reasons 12/31/2014      Ultrasound Screening f covered by Medicare Part B unless medically necessary (cut with metal); may be covered with your pharmacy prescription benefits -    Tetanus, Diptheria and Pertusis TD and TDaP Not covered by Medicare Part B -  No recommendations at this time    Zoster Not

## 2021-11-24 ENCOUNTER — LAB ENCOUNTER (OUTPATIENT)
Dept: LAB | Age: 81
End: 2021-11-24
Attending: FAMILY MEDICINE
Payer: MEDICARE

## 2021-11-24 DIAGNOSIS — E55.9 VITAMIN D DEFICIENCY: ICD-10-CM

## 2021-11-24 DIAGNOSIS — R06.02 SOB (SHORTNESS OF BREATH) ON EXERTION: ICD-10-CM

## 2021-11-24 DIAGNOSIS — I10 ESSENTIAL HYPERTENSION: ICD-10-CM

## 2021-11-24 DIAGNOSIS — R73.01 IFG (IMPAIRED FASTING GLUCOSE): ICD-10-CM

## 2021-11-24 DIAGNOSIS — E78.5 DYSLIPIDEMIA: ICD-10-CM

## 2021-11-24 DIAGNOSIS — Z00.00 LABORATORY EXAM ORDERED AS PART OF ROUTINE GENERAL MEDICAL EXAMINATION: ICD-10-CM

## 2021-11-24 DIAGNOSIS — R05.9 COUGH: ICD-10-CM

## 2021-11-24 PROCEDURE — 80053 COMPREHEN METABOLIC PANEL: CPT

## 2021-11-24 PROCEDURE — 36415 COLL VENOUS BLD VENIPUNCTURE: CPT

## 2021-11-24 PROCEDURE — 83036 HEMOGLOBIN GLYCOSYLATED A1C: CPT

## 2021-11-24 PROCEDURE — 82306 VITAMIN D 25 HYDROXY: CPT

## 2021-11-24 PROCEDURE — 83880 ASSAY OF NATRIURETIC PEPTIDE: CPT

## 2021-11-24 PROCEDURE — 80061 LIPID PANEL: CPT

## 2021-11-24 PROCEDURE — 85025 COMPLETE CBC W/AUTO DIFF WBC: CPT

## 2021-11-30 PROBLEM — R73.01 IFG (IMPAIRED FASTING GLUCOSE): Status: ACTIVE | Noted: 2021-11-24

## 2021-12-07 ENCOUNTER — TELEPHONE (OUTPATIENT)
Dept: FAMILY MEDICINE CLINIC | Facility: CLINIC | Age: 81
End: 2021-12-07

## 2021-12-07 NOTE — TELEPHONE ENCOUNTER
At visit agreed to- do 2 wk trial on omeprazole 20mg 2# and call back with how doing. So, by her message did she increase to 40mg total of omeprazole or not? If she did, but cough still bad to let me know as would then pursue allergy as a possible contributing factor.

## 2021-12-08 ENCOUNTER — OFFICE VISIT (OUTPATIENT)
Dept: PODIATRY CLINIC | Facility: CLINIC | Age: 81
End: 2021-12-08
Payer: MEDICARE

## 2021-12-08 DIAGNOSIS — L84 CALLUS OF FOOT: ICD-10-CM

## 2021-12-08 DIAGNOSIS — M79.671 RIGHT FOOT PAIN: Primary | ICD-10-CM

## 2021-12-08 DIAGNOSIS — Q82.8 POROKERATOSIS: ICD-10-CM

## 2021-12-08 PROCEDURE — 99213 OFFICE O/P EST LOW 20 MIN: CPT | Performed by: PODIATRIST

## 2021-12-08 NOTE — TELEPHONE ENCOUNTER
Spoke to patient. She has been taking omeprazole 40 mg for past 2 weeks and it has been helping a lot but still not 100%.     Would like to try for 2 more weeks and will update

## 2021-12-08 NOTE — PROGRESS NOTES
Phani Gillette is a [de-identified]year old female. Patient presents with: Foot Pain: Right foot f/u, pain side foot when wearing slippers and not wearing good support.         HPI:   Patient returns the clinic complaining of right foot pain down underneath on the roopa • DDD (degenerative disc disease), thoracic 08/15/2013   • Dyslipidemia    • Essential hypertension    • Hyperplastic colonic polyp 06/27/2012    repeated in 12/17 and no recall needed 2/2 age unless pt has sxs   • IFG (impaired fasting glucose) 11/24/20 of a porokeratosis. There is no evidence of petechial hemorrhage these are more than likely a porokeratotic lesion and not verruca. 2. Vascular: Patient has palpable dorsalis pedis posterior tibial pulses on the right   3.  Neurologic: The patient has in

## 2022-01-11 DIAGNOSIS — K21.9 GASTROESOPHAGEAL REFLUX DISEASE: ICD-10-CM

## 2022-01-12 RX ORDER — OMEPRAZOLE 20 MG/1
20 CAPSULE, DELAYED RELEASE ORAL
Qty: 90 CAPSULE | Refills: 1 | Status: SHIPPED | OUTPATIENT
Start: 2022-01-12 | End: 2022-07-08

## 2022-01-12 NOTE — TELEPHONE ENCOUNTER
If still coughing try claritin or allegra. If already doing this to add OTC fluticasone nasal spray. To see me if still struggling with a cough.

## 2022-05-04 RX ORDER — SIMVASTATIN 40 MG
TABLET ORAL
Qty: 90 TABLET | Refills: 1 | Status: SHIPPED | OUTPATIENT
Start: 2022-05-04

## 2022-07-06 DIAGNOSIS — K21.9 GASTROESOPHAGEAL REFLUX DISEASE: ICD-10-CM

## 2022-07-07 NOTE — TELEPHONE ENCOUNTER
Pt is wondering if dr can refill the medication for her, She says she has moved to the [facility], and still in [facility], but has not yet found a PCP there yet. She does not have a car, and cannot make an appointment to come in, but she would be willing to do a TV with  if she needs. Please advise.

## 2022-07-08 RX ORDER — OMEPRAZOLE 20 MG/1
CAPSULE, DELAYED RELEASE ORAL
Qty: 90 CAPSULE | Refills: 1 | Status: SHIPPED | OUTPATIENT
Start: 2022-07-08

## 2022-08-04 DIAGNOSIS — M85.859 OSTEOPENIA OF NECK OF FEMUR, UNSPECIFIED LATERALITY: ICD-10-CM

## 2022-08-04 DIAGNOSIS — I10 ESSENTIAL HYPERTENSION: ICD-10-CM

## 2022-08-04 RX ORDER — ALENDRONATE SODIUM 70 MG/1
70 TABLET ORAL WEEKLY
Qty: 12 TABLET | Refills: 0 | Status: SHIPPED | OUTPATIENT
Start: 2022-08-04

## 2022-08-04 RX ORDER — LISINOPRIL 20 MG/1
20 TABLET ORAL DAILY
Qty: 90 TABLET | Refills: 0 | Status: SHIPPED | OUTPATIENT
Start: 2022-08-04

## 2022-08-04 NOTE — TELEPHONE ENCOUNTER
Patient recently moved to the city and she has a new patient appt in November. She is in need of 2 refills; Alendronate & lisinipril to the [facility]. If a VV or TV is necessary, she is more than willing to book something, just can't come in person. Please advise.

## 2022-10-25 DIAGNOSIS — E78.5 DYSLIPIDEMIA: ICD-10-CM

## 2022-10-25 RX ORDER — SIMVASTATIN 40 MG
TABLET ORAL
Qty: 90 TABLET | Refills: 1 | Status: SHIPPED | OUTPATIENT
Start: 2022-10-25

## 2022-11-02 ENCOUNTER — TELEPHONE (OUTPATIENT)
Dept: FAMILY MEDICINE CLINIC | Facility: CLINIC | Age: 82
End: 2022-11-02

## 2022-11-02 NOTE — TELEPHONE ENCOUNTER
Patient states she moved to [facility], has a new PCP she will be seeing at the end of the month, would like to request Lisinipril, whatever the doctor will give her, to hold her until the end of the month, please advise. She says, Latasha Tomlinson won't be bothering her anymore after that\" a short supply is fine too, thank you.

## 2023-01-04 NOTE — TELEPHONE ENCOUNTER
Spoke to pt and let her know she has fasting labs and they are in the system.  Pt is aware that 30 days were called in of medication Apixaban/Eliquis - Compliance/Apixaban/Eliquis - Dietary Advice/Apixaban/Eliquis - Follow up monitoring/Apixaban/Eliquis - Potential for adverse drug reactions and interactions

## 2024-11-01 NOTE — PATIENT INSTRUCTIONS
[facility] SCREENING SCHEDULE   Tests on this list are recommended by your physician but may not be covered, or covered at this frequency, by your insurer. Please check with your insurance carrier before scheduling to verify coverage.    PREVENTATIV are 73-68 years old and have smoked more than 100 cigarettes in their lifetime   • Anyone with a family history    Colorectal Cancer Screening  Covered up to Age 76     Colonoscopy Screen   Covered every 10 years- more often if abnormal There are no preven 1.48 Immunizations      Influenza  Covered Annually Orders placed or performed in visit on 10/14/16   • FLU VACC [address] ANTIG   Orders placed or performed in visit on 10/22/14   • ADMIN INFLUENZA VIRUS VAC    Please get every year    Pneumococcal 13 (P Vietnamese)  www. [email or web address]. org  This link also has information from the [address] [place] [address] regarding Advance Directives.

## (undated) DIAGNOSIS — E78.5 DYSLIPIDEMIA: ICD-10-CM

## (undated) NOTE — MR AVS SNAPSHOT
[facility] [address] [place] [address] [address] [facility]  [id number] [phone]               Thank you for choosing us for your health care visit with Sarahi Blank DO.   We are glad to serve you and happy to provide you wi · Otitis media. Infection of the middle ear. · Bacteremia or septicemia. Infection in the blood. Pneumococcal disease can be life-threatening, especially for people in high-risk groups. Each year, thousands of people die of this disease.  Thousands more b vision, chest pain, and shortness of breath. But even without symptoms, high blood pressure that’s not treated raises your risk for heart attack and stroke. High blood pressure is a serious health risk and shouldn’t be ignored.   A blood pressure reading is · Stop smoking. If you are a long-time smoker, this can be hard. Enroll in a stop-smoking program to make it more likely that you will quit for good. · Learn how to handle stress. This is an important part of any program to lower blood pressure.  Learn abo Allergies as of Feb 16, 2017     Adhesive Tape Other (See Comments)    Skin cracks open    Amlodipine Besylate Swelling    Ankle swelling    Latex Swelling    Facial swelling, skin cracks open                Today's Vital Signs     BP Pulse Temp Height Huynh Cipro Comment:  Evaluate and treat          Gavi Martin DO [address]   Phone:  [phone]   Fax:  [phone]    Diagnoses:  Essential hypertension with goal blood pressure less than 130/80   Continuou authorization, such as [facility], please feel free to schedule your appointment immediately. However, if you are unsure about the requirements for authorization, please wait 5-7 days and then contact your physician's office.  At that time, you will drinks, candies and desserts   Eat plenty of low-fat dairy products High fat meats and dairy   Choose whole grain products Foods high in sodium   Water is best for hydration Fast food.    Eat at home when possible     Tips for increasing your physical activ

## (undated) NOTE — LETTER
09/28/17        Blaine Herndon  [address]  [facility] [address]      Dear Grayson Ramos,    [address] [facility] records indicate that you have outstanding lab work and or testing that was ordered for you and has not yet been completed:                CBC W Differential W

## (undated) NOTE — LETTER
03/18/19    Vishnu Farias [facility] [address]    Dear Tootie Hopkins: It was a pleasure speaking with you over the phone recently.  To follow up, I wanted to send you some contact information to utilize when you have a question and or need so

## (undated) NOTE — LETTER
03/18/19    [place] [address]      Dear Freda Quick: It was a pleasure speaking with you over the phone recently.  To follow up, I wanted to send you my contact information to utilize when you have a question and or need so